# Patient Record
(demographics unavailable — no encounter records)

---

## 2017-10-02 NOTE — XMS
Clinical Summary

 Created on:2017



Patient:Leighann Hong

Sex:Female

:[id number]

External Reference #:[id number]





Demographics







 Address  [address] [place] [id number]

 

 Home Phone  [phone]

 

 Preferred Language  Unknown

 

 Marital Status  

 

 Sikhism Affiliation  Unknown

 

 Race  White

 

 Ethnic Group  Not  or 









Author







 Organization  [facility]

 

 Address  [address] [place] [address]

 

 Phone  [phone]









Care Team Providers







 Name  Role  Phone

 

 ,  Primary Care Provider  Unavailable









Allergies

Not on File



Current Medications

Not on file



Active Problems

Not on file



Social History







 Tobacco Use  Types  Packs/Day  Years Used  Date

 

 Never Assessed        









 Sex Assigned at Birth  Date Recorded

 

 Not on file  







Last Filed Vital Signs

Not on file



Plan of Treatment

Not on file



Results

Not on filefrom Last 3 Months

## 2017-10-05 NOTE — EKG
Test Reason : 

Blood Pressure : ***/*** mmHG

Vent. Rate : 090 BPM     Atrial Rate : 090 BPM

   P-R Int : 190 ms          QRS Dur : 134 ms

    QT Int : 410 ms       P-R-T Axes : [phone] degrees

   QTc Int : 501 ms

 

Normal sinus rhythm

Possible Left atrial enlargement

Non-specific intra-ventricular conduction block

Possible Anterolateral infarct (cited on or before 12-NOV-2015)

Abnormal ECG

When compared with ECG of 12-NOV-2015 16:38,

No significant change was found

Confirmed by PEDRO LUIS SMART ([id number]) on 10/5/2017 6:44:02 AM

 

Referred By:  MCKINLEY           Confirmed By:PEDRO LUIS SMART

## 2017-10-16 NOTE — NM
MYOCARDIAL PERFUSION SCAN:

 

TECHNIQUE:

The patient was given 9 millicuries of technetium sestamibi for rest imaging and 33 millicuries for 
stress imaging.  The patient was stressed according to adenosine protocol.  The patient obtained 72%
 of maximal age predicted heart rate.

 

The left ventricle was imaged with SPECT imaging and CT attenuation images were obtained.

 

FINDINGS:

Normal activity is seen throughout the left ventricle on stress and rest images.  No evidence of rev
ersible ischemia.

 

There is global hypokinesia.  The ejection fraction is recorded at 38%.

 

IMPRESSION:

1.  No evidence of reversible ischemia.

 

2.  Global hypokinesia.

 

POS: JUAN ALBERTO